# Patient Record
Sex: FEMALE | Race: BLACK OR AFRICAN AMERICAN | Employment: UNEMPLOYED | URBAN - METROPOLITAN AREA
[De-identification: names, ages, dates, MRNs, and addresses within clinical notes are randomized per-mention and may not be internally consistent; named-entity substitution may affect disease eponyms.]

---

## 2017-02-24 ENCOUNTER — HOSPITAL ENCOUNTER (OUTPATIENT)
Dept: ULTRASOUND IMAGING | Age: 57
Discharge: HOME | End: 2017-02-24
Attending: PHYSICIAN ASSISTANT
Payer: MEDICAID

## 2017-02-24 DIAGNOSIS — N95.0 POSTMENOPAUSAL BLEEDING: ICD-10-CM

## 2017-02-24 PROCEDURE — 76830 TRANSVAGINAL US NON-OB: CPT

## 2017-02-24 NOTE — PROGRESS NOTES
Ultrasound shows an endometrial lining of her uterus that is too thick. She will need a gyn referral to have a biopsy done. We will arrange for that during upcoming appt on Monday.

## 2017-04-13 PROCEDURE — 88305 TISSUE EXAM BY PATHOLOGIST: CPT | Performed by: OBSTETRICS & GYNECOLOGY

## 2017-04-17 ENCOUNTER — HOSPITAL ENCOUNTER (OUTPATIENT)
Dept: LAB | Age: 57
Discharge: HOME | End: 2017-04-17

## 2017-04-27 ENCOUNTER — HOSPITAL ENCOUNTER (OUTPATIENT)
Dept: MAMMOGRAPHY | Age: 57
Discharge: HOME | End: 2017-04-27
Attending: PHYSICIAN ASSISTANT
Payer: MEDICAID

## 2017-04-27 DIAGNOSIS — Z12.31 ENCOUNTER FOR SCREENING MAMMOGRAM FOR BREAST CANCER: ICD-10-CM

## 2017-04-27 PROCEDURE — 77067 SCR MAMMO BI INCL CAD: CPT

## 2017-05-08 NOTE — PROGRESS NOTES
Spoke with patient and gave results per [facility].   Patient voiced understanding

## 2017-06-21 ENCOUNTER — HOSPITAL ENCOUNTER (OUTPATIENT)
Dept: SURGERY | Age: 57
Discharge: HOME | End: 2017-06-21

## 2017-06-21 NOTE — PERIOP NOTES
Pt did not come for 15:30 PAT appointment. I called and left voicemail asking pt to return call to [phone]. I called and notified Dr Alex Domínguez office- left voicemail with Migue Cosme, surgery scheduler.

## 2017-06-22 ENCOUNTER — HOSPITAL ENCOUNTER (OUTPATIENT)
Dept: SURGERY | Age: 57
Discharge: HOME | End: 2017-06-22
Attending: OBSTETRICS & GYNECOLOGY
Payer: MEDICAID

## 2017-06-22 VITALS
TEMPERATURE: 97.2 F | DIASTOLIC BLOOD PRESSURE: 88 MMHG | RESPIRATION RATE: 14 BRPM | OXYGEN SATURATION: 96 % | HEIGHT: 66 IN | WEIGHT: 258.13 LBS | HEART RATE: 62 BPM | SYSTOLIC BLOOD PRESSURE: 157 MMHG | BODY MASS INDEX: 41.49 KG/M2

## 2017-06-22 LAB
GLUCOSE BLD STRIP.AUTO-MCNC: 148 MG/DL (ref 65–100)
POTASSIUM SERPL-SCNC: 3.5 MMOL/L (ref 3.5–5.1)

## 2017-06-22 PROCEDURE — 82962 GLUCOSE BLOOD TEST: CPT

## 2017-06-22 PROCEDURE — 84132 ASSAY OF SERUM POTASSIUM: CPT | Performed by: ANESTHESIOLOGY

## 2017-06-22 NOTE — PERIOP NOTES
Patient verified name, , and surgery as listed in [facility]. Type 1B surgery, PAT assessment complete. Labs per surgeon: no orders in EMR at this time  Labs per anesthesia protocol: SQBS= 148; K+ collected- result pending  EKG: not needed; EKG in EMR (2017) in EMR for anesthesia reference    Hibiclens and instructions given per hospital policy. Patient provided with handouts including Guide to Surgery, Pain Management, Hand Hygiene, Blood Transfusion Education, and [facility] Brochure. Patient answered medical/surgical history questions at their best of ability. All prior to admission medications documented in [facility]. Original medication prescription bottles not visualized during patient appointment. Patient instructed to hold all vitamins 7 days prior to surgery and NSAIDS 5 days prior to surgery, patient verbalized understanding. Medications to be held- none. Patient instructed to continue previous medications as prescribed prior to surgery and to take the following medications the day of surgery according to anesthesia guidelines with a small sip of water: symbicort inhaler, albuterol inhaler- bring DOS, amlodipine, klonopin, zoloft. Patient taught back and verbalized understanding.

## 2017-06-23 NOTE — INTERVAL H&P NOTE
H&P Update:  Antoine Bowen was seen and examined. History and physical has been reviewed. The patient has been examined.  There have been no significant clinical changes since the completion of the originally dated History and Physical.    Signed By: Anatoly Noe MD     June 23, 2017 8:52 AM

## 2017-06-23 NOTE — PERIOP NOTES
K+ done 6/22/17 wnl    Recent Results (from the past 24 hour(s))   GLUCOSE, POC    Collection Time: 06/22/17  4:00 PM   Result Value Ref Range    Glucose (POC) 148 (H) 65 - 100 mg/dL   POTASSIUM    Collection Time: 06/22/17  4:01 PM   Result Value Ref Range    Potassium 3.5 3.5 - 5.1 mmol/L

## 2017-06-23 NOTE — H&P
HPI:  Shravan Talavera is a 55 y.o. female seen for Post Menopausal Bleeding   .           Past Medical History, Past Surgical History, Family history, Social History, and Medications were all reviewed with the patient today and updated as necessary.           Current Outpatient Prescriptions   Medication Sig    metFORMIN ER (GLUCOPHAGE XR) 500 mg tablet TAKE ONE TABLET BY MOUTH ONE TIME DAILY WITH DINNER    minoxidil (LONITEN) 2.5 mg tablet TAKE ONE TABLET BY MOUTH TWICE A DAY    clonazePAM (KLONOPIN) 1 mg tablet Take 1 Tab by mouth two (2) times a day. Max Daily Amount: 2 mg. Do not fill until 3/10/17 or after.  amLODIPine (NORVASC) 10 mg tablet Take 1 Tab by mouth daily.  sertraline (ZOLOFT) 100 mg tablet Take 1 Tab by mouth daily.  pravastatin (PRAVACHOL) 10 mg tablet Take 1 Tab by mouth nightly.  ergocalciferol (ERGOCALCIFEROL) 50,000 unit capsule Take 1 Cap by mouth every seven (7) days.  sAXagliptin (ONGLYZA) 5 mg tab tablet Take 1 Tab by mouth daily.  losartan (COZAAR) 100 mg tablet Take 1 Tab by mouth daily. Indications: Hypertension    TRUE METRIX GLUCOSE METER misc      ULTRA THIN LANCETS 31 gauge misc      budesonide-formoterol (SYMBICORT) 160-4.5 mcg/actuation HFA inhaler Take 2 Puffs by inhalation two (2) times a day.  zolpidem (AMBIEN) 10 mg tablet Take 1 Tab by mouth nightly as needed for Sleep. Max Daily Amount: 10 mg.    metoprolol succinate (TOPROL-XL) 100 mg tablet Take 1 Tab by mouth nightly.  albuterol (PROVENTIL HFA) 90 mcg/actuation inhaler Take 2 Puffs by inhalation every six (6) hours as needed for Wheezing or Shortness of Breath.  spironolactone (ALDACTONE) 25 mg tablet Take 1 Tab by mouth daily.  fluticasone (FLONASE) 50 mcg/actuation nasal spray Squirt 2 sprays/nostril once daily    Cholecalciferol, Vitamin D3, (VITAMIN D3) 2,000 unit cap capsule Take 2 capsules po daily    cpap machine kit by Does Not Apply route.  13 cm    albuterol (PROVENTIL VENTOLIN) 2.5 mg /3 mL (0.083 %) nebulizer solution 3 mL by Nebulization route every four (4) hours as needed for Wheezing.  levalbuterol (XOPENEX) 1.25 mg/3 mL nebulizer solution 3 mL by Nebulization route every four (4) hours as needed for Wheezing.     TRUE METRIX GLUCOSE TEST STRIP strip        No current facility-administered medications for this visit.            Allergies   Allergen Reactions    Chlorthalidone Other (comments)    Lipitor [Atorvastatin] Other (comments)    Vit D3-Vit K-Berberine-Hops Other (comments)           Past Medical History:   Diagnosis Date    Asthma       Intrinsic    Chronic insomnia      Degenerative arthropathy of spinal facet joint 10/22/2015     L4-L5    Depression      Generalized anxiety disorder      GERD (gastroesophageal reflux disease)      Hypertension      Mixed hyperlipidemia 1/21/2015    Obstructive sleep apnea      Osteoarthritis of hand 8/21/2014    Peripheral neuropathy ([address]      Thoracic degenerative disc disease 11/24/2015     Mild    Tobacco use disorder      Type 2 diabetes mellitus (HCC)      Vitamin D deficiency              Past Surgical History:   Procedure Laterality Date    HX CHOLECYSTECTOMY        HX COLONOSCOPY        HX ENDOSCOPY                Family History   Problem Relation Age of Onset    Diabetes Father      Hypertension Father      Asthma Father      Asthma Sister      Diabetes Sister      Hypertension Sister      COPD Sister      No Known Problems Brother      No Known Problems Sister      No Known Problems Sister      No Known Problems Sister      Stroke Brother      Hypertension Brother               Social History   Substance Use Topics    Smoking status: Current Every Day Smoker       Packs/day: 1.00       Years: 36.00       Types: Cigarettes    Smokeless tobacco: Never Used         Comment: Currently 3-4 cigarettes/day    Alcohol use No         There is no immunization history on file for this patient.        History   Sexual Activity    Sexual activity: No                   Obstetric History       T0      TAB0   SAB0   E0   M0   L0        # Outcome Date GA Lbr Jhony/2nd Weight Sex Delivery Anes PTL Lv   2 Para                     1 Para                         Obstetric Comments   2 vaginal births              Health Maintenance   Topic Date Due    COLONOSCOPY  1979    Pneumococcal 19-64 Medium Risk (1 of 1 - PPSV23) 1980    DTaP/Tdap/Td series (1 - Tdap) 1982    BREAST CANCER SCRN MAMMOGRAM  2011    FOOT EXAM Q1  2017    HEMOGLOBIN A1C Q6M  2017    MICROALBUMIN Q1  2017    LIPID PANEL Q1  2018    EYE EXAM RETINAL OR DILATED Q1  2018    PAP AKA CERVICAL CYTOLOGY  2020    Hepatitis C Screening  Completed    INFLUENZA AGE 9 TO ADULT  Addressed         ROS     Review of Systems      PHYSICAL EXAM:          Visit Vitals    /90 (BP Patient Position: At rest)    Ht 5' 6\" (1.676 m)    Wt 263 lb (119.3 kg)    LMP 2010  Comment: spotting     BMI 42.45 kg/m2         Physical Exam     Medical problems and test results were reviewed with the patient today.      ASSESSMENT and PLAN     Osmany Figueroa was seen today for post menopausal bleeding.     Diagnoses and all orders for this visit:     Postmenopausal bleeding  -     BIOPSY OF UTERUS LINING  -     SURGICAL PATHOLOGY     Thickened endometrium  -     BIOPSY OF UTERUS LINING  -     SURGICAL PATHOLOGY                 Follow-up Disposition: Not on File        Chuy Brandt Medical Assistant  2017         Electronically signed by Chuy Brandt at 17 5922        Office Visit on 2017              Detailed Report         Note Details   Author Chuy Brandt File Time 17 [address] [place] [address]   Author Type Medical Assistant Status Signed   Last  Chuy Brandt Assistant Service (none)

## 2017-06-28 ENCOUNTER — HOSPITAL ENCOUNTER (OUTPATIENT)
Age: 57
Setting detail: OUTPATIENT SURGERY
Discharge: HOME | End: 2017-06-28
Attending: OBSTETRICS & GYNECOLOGY | Admitting: OBSTETRICS & GYNECOLOGY
Payer: MEDICAID

## 2017-06-28 ENCOUNTER — ANESTHESIA (OUTPATIENT)
Dept: SURGERY | Age: 57
End: 2017-06-28
Payer: MEDICAID

## 2017-06-28 ENCOUNTER — ANESTHESIA EVENT (OUTPATIENT)
Dept: SURGERY | Age: 57
End: 2017-06-28
Payer: MEDICAID

## 2017-06-28 VITALS
OXYGEN SATURATION: 92 % | TEMPERATURE: 97.6 F | HEART RATE: 65 BPM | RESPIRATION RATE: 18 BRPM | BODY MASS INDEX: 41.97 KG/M2 | SYSTOLIC BLOOD PRESSURE: 191 MMHG | DIASTOLIC BLOOD PRESSURE: 88 MMHG | WEIGHT: 260 LBS

## 2017-06-28 DIAGNOSIS — N93.8 DUB (DYSFUNCTIONAL UTERINE BLEEDING): Primary | ICD-10-CM

## 2017-06-28 LAB
GLUCOSE BLD STRIP.AUTO-MCNC: 106 MG/DL (ref 65–100)
HCG UR QL: NEGATIVE

## 2017-06-28 PROCEDURE — 76010000138 HC OR TIME 0.5 TO 1 HR: Performed by: OBSTETRICS & GYNECOLOGY

## 2017-06-28 PROCEDURE — 88305 TISSUE EXAM BY PATHOLOGIST: CPT | Performed by: OBSTETRICS & GYNECOLOGY

## 2017-06-28 PROCEDURE — 81025 URINE PREGNANCY TEST: CPT

## 2017-06-28 PROCEDURE — 76210000021 HC REC RM PH II 0.5 TO 1 HR: Performed by: OBSTETRICS & GYNECOLOGY

## 2017-06-28 PROCEDURE — 74011250636 HC RX REV CODE- 250/636

## 2017-06-28 PROCEDURE — 74011250636 HC RX REV CODE- 250/636: Performed by: ANESTHESIOLOGY

## 2017-06-28 PROCEDURE — 76060000032 HC ANESTHESIA 0.5 TO 1 HR: Performed by: OBSTETRICS & GYNECOLOGY

## 2017-06-28 PROCEDURE — 77030020782 HC GWN BAIR PAWS FLX 3M -B: Performed by: ANESTHESIOLOGY

## 2017-06-28 PROCEDURE — 74011000250 HC RX REV CODE- 250

## 2017-06-28 PROCEDURE — 76210000006 HC OR PH I REC 0.5 TO 1 HR: Performed by: OBSTETRICS & GYNECOLOGY

## 2017-06-28 PROCEDURE — 77030018836 HC SOL IRR NACL ICUM -A: Performed by: OBSTETRICS & GYNECOLOGY

## 2017-06-28 PROCEDURE — 77030020143 HC AIRWY LARYN INTUB CGAS -A: Performed by: ANESTHESIOLOGY

## 2017-06-28 PROCEDURE — 82962 GLUCOSE BLOOD TEST: CPT

## 2017-06-28 RX ORDER — SODIUM CHLORIDE 0.9 % (FLUSH) 0.9 %
5-10 SYRINGE (ML) INJECTION AS NEEDED
Status: DISCONTINUED | OUTPATIENT
Start: 2017-06-28 | End: 2017-06-28 | Stop reason: HOSPADM

## 2017-06-28 RX ORDER — LIDOCAINE HYDROCHLORIDE 10 MG/ML
0.1 INJECTION INFILTRATION; PERINEURAL AS NEEDED
Status: DISCONTINUED | OUTPATIENT
Start: 2017-06-28 | End: 2017-06-28 | Stop reason: HOSPADM

## 2017-06-28 RX ORDER — ONDANSETRON 2 MG/ML
INJECTION INTRAMUSCULAR; INTRAVENOUS AS NEEDED
Status: DISCONTINUED | OUTPATIENT
Start: 2017-06-28 | End: 2017-06-28 | Stop reason: HOSPADM

## 2017-06-28 RX ORDER — SODIUM CHLORIDE, SODIUM LACTATE, POTASSIUM CHLORIDE, CALCIUM CHLORIDE 600; 310; 30; 20 MG/100ML; MG/100ML; MG/100ML; MG/100ML
75 INJECTION, SOLUTION INTRAVENOUS CONTINUOUS
Status: DISCONTINUED | OUTPATIENT
Start: 2017-06-28 | End: 2017-06-28 | Stop reason: HOSPADM

## 2017-06-28 RX ORDER — LIDOCAINE HYDROCHLORIDE 20 MG/ML
INJECTION, SOLUTION EPIDURAL; INFILTRATION; INTRACAUDAL; PERINEURAL AS NEEDED
Status: DISCONTINUED | OUTPATIENT
Start: 2017-06-28 | End: 2017-06-28 | Stop reason: HOSPADM

## 2017-06-28 RX ORDER — SODIUM CHLORIDE 0.9 % (FLUSH) 0.9 %
5-10 SYRINGE (ML) INJECTION EVERY 8 HOURS
Status: DISCONTINUED | OUTPATIENT
Start: 2017-06-28 | End: 2017-06-28 | Stop reason: HOSPADM

## 2017-06-28 RX ORDER — HYDROMORPHONE HYDROCHLORIDE 2 MG/ML
0.5 INJECTION, SOLUTION INTRAMUSCULAR; INTRAVENOUS; SUBCUTANEOUS
Status: DISCONTINUED | OUTPATIENT
Start: 2017-06-28 | End: 2017-06-28 | Stop reason: HOSPADM

## 2017-06-28 RX ORDER — OXYCODONE AND ACETAMINOPHEN 5; 325 MG/1; MG/1
1 TABLET ORAL AS NEEDED
Status: DISCONTINUED | OUTPATIENT
Start: 2017-06-28 | End: 2017-06-28 | Stop reason: HOSPADM

## 2017-06-28 RX ORDER — FENTANYL CITRATE 50 UG/ML
INJECTION, SOLUTION INTRAMUSCULAR; INTRAVENOUS AS NEEDED
Status: DISCONTINUED | OUTPATIENT
Start: 2017-06-28 | End: 2017-06-28 | Stop reason: HOSPADM

## 2017-06-28 RX ORDER — NALOXONE HYDROCHLORIDE 0.4 MG/ML
0.2 INJECTION, SOLUTION INTRAMUSCULAR; INTRAVENOUS; SUBCUTANEOUS AS NEEDED
Status: DISCONTINUED | OUTPATIENT
Start: 2017-06-28 | End: 2017-06-28 | Stop reason: HOSPADM

## 2017-06-28 RX ORDER — PROPOFOL 10 MG/ML
INJECTION, EMULSION INTRAVENOUS AS NEEDED
Status: DISCONTINUED | OUTPATIENT
Start: 2017-06-28 | End: 2017-06-28 | Stop reason: HOSPADM

## 2017-06-28 RX ORDER — FAMOTIDINE 20 MG/1
20 TABLET, FILM COATED ORAL ONCE
Status: DISCONTINUED | OUTPATIENT
Start: 2017-06-28 | End: 2017-06-28 | Stop reason: HOSPADM

## 2017-06-28 RX ORDER — MIDAZOLAM HYDROCHLORIDE 1 MG/ML
2 INJECTION, SOLUTION INTRAMUSCULAR; INTRAVENOUS
Status: DISCONTINUED | OUTPATIENT
Start: 2017-06-28 | End: 2017-06-28 | Stop reason: HOSPADM

## 2017-06-28 RX ORDER — HYDROCODONE BITARTRATE AND ACETAMINOPHEN 7.5; 325 MG/1; MG/1
1 TABLET ORAL
Qty: 6 TAB | Refills: 0 | Status: SHIPPED | OUTPATIENT
Start: 2017-06-28 | End: 2017-11-28

## 2017-06-28 RX ORDER — HYDRALAZINE HYDROCHLORIDE 20 MG/ML
10 INJECTION INTRAMUSCULAR; INTRAVENOUS
Status: COMPLETED | OUTPATIENT
Start: 2017-06-28 | End: 2017-06-28

## 2017-06-28 RX ADMIN — LIDOCAINE HYDROCHLORIDE 40 MG: 20 INJECTION, SOLUTION EPIDURAL; INFILTRATION; INTRACAUDAL; PERINEURAL at 08:13

## 2017-06-28 RX ADMIN — PROPOFOL 200 MG: 10 INJECTION, EMULSION INTRAVENOUS at 08:13

## 2017-06-28 RX ADMIN — SODIUM CHLORIDE, SODIUM LACTATE, POTASSIUM CHLORIDE, AND CALCIUM CHLORIDE: 600; 310; 30; 20 INJECTION, SOLUTION INTRAVENOUS at 08:01

## 2017-06-28 RX ADMIN — HYDRALAZINE HYDROCHLORIDE 10 MG: 20 INJECTION INTRAMUSCULAR; INTRAVENOUS at 09:06

## 2017-06-28 RX ADMIN — ONDANSETRON 4 MG: 2 INJECTION INTRAMUSCULAR; INTRAVENOUS at 08:21

## 2017-06-28 RX ADMIN — FENTANYL CITRATE 50 MCG: 50 INJECTION, SOLUTION INTRAMUSCULAR; INTRAVENOUS at 08:02

## 2017-06-28 NOTE — BRIEF OP NOTE
BRIEF OPERATIVE NOTE    Date of Procedure: 6/28/2017   Preoperative Diagnosis: Post-menopausal bleeding [N95.0]  Postoperative Diagnosis: Post-menopausal bleeding [N95.0]    Procedure(s):  DILATATION AND CURETTAGE HYSTEROSCOPY  Surgeon(s) and Role:     * Rambo Andre MD - Primary         Assistant Staff:       Surgical Staff:  Circ-1: Anoop Lafleur RN  Scrub Tech-1: Tsering Del Cid  Event Time In   Incision Start 7715   Incision Close 0830     Anesthesia: General   Estimated Blood Loss: 5  Specimens:   ID Type Source Tests Collected by Time Destination   1 : Endocervical currettings Preservative Pelvis  Rambo Andre MD 6/28/2017 0022 Pathology   2 : Endometrial currettings Preservative Pelvis  Rambo Andre MD 6/28/2017 0825 Pathology      Findings: diffusely thickened endo w/ polyp @ int os   Complications: 0  Implants: * No implants in log *

## 2017-06-28 NOTE — OP NOTES
HYSTEROSCOPY WITH DILATATION AND CURETTAGE    DATE OF PROCEDURE: 6/28/2017     PREOPERATIVE DIAGNOSIS: Post-menopausal bleeding [N95.0]     POSTOPERATWE DIAGNOSIS: Post-menopausal bleeding [N95.0]    PROCEDURE: Hysteroscopy with dilatation & curettage. SURGEON: Kiki Rodriguez MD    ANESTHESIA: General.    DRAINS: Sterile in and out catheterization of the bladder. FINDINGS: Normal endocervical canal with a small polypoid structure at the internal  os. Endometrial cavity was symmetrical, homogenous with palpation and curettage. Tubal ostia was readily visible, symmetrical and in appropriate locations. No visible evidence of any surface irregularities of the endometrial surface and no visible asymmetry to suggest a point of penetration or an embedded foreign object    PROCEDURE IN DETAIL: The patient was taken to the Operating Room. The patient was anesthetized using GET. After adequate anesthesia was established she was properly positioned, scrubbed, prepped and draped. Time out was done to confirm the operating procedure, surgeon, patient and site. Once confirmed by the team, procedure was started. The weighted speculum was placed in the vault to expose the cervix, was grasped at 12 oclock with the single-tooth tenaculum and sounded to 8 cm. It was sequentially dilated prior to the hysteroscope being inserted with the above noted findings. A very gentle but homogenous curetting was done starting in the midline and working in a clockwise manner. A small amount of normal appearing endometrial tissue was reirieved. Preston-Stone forceps were used to remove the clots that were seen when hysteroscope was reinserted. The hysteroscope was reinserted again. There was no evidence of any surface irregularity to suggest recent penetration or presence of an embedded foreign object such as an intrauterine device or other foreign object. With this complete and thorough visual review, we terminated the procedure.  With the sponge, instrument and needle count correct, the patient was awakened and taken to the Recovery Room in satisfactory condition. BLOOD LOSS ESTIMATED: minimal    SPECIMENS: Endocervical curettings; Endometrial curettings.

## 2017-06-28 NOTE — ANESTHESIA POSTPROCEDURE EVALUATION
Post-Anesthesia Evaluation and Assessment    Patient: Raffy Hernandez MRN: [id number]  SSN: [id number]    YOB: 1960  Age: 55 y.o. Sex: female       Cardiovascular Function/Vital Signs  Visit Vitals    /88 (BP 1 Location: Left arm, BP Patient Position: At rest)    Pulse 65    Temp 36.4 °C (97.6 °F)    Resp 18    Wt 117.9 kg (260 lb)    SpO2 92%    BMI 41.97 kg/m2       Patient is status post general anesthesia for Procedure(s):  DILATATION AND CURETTAGE HYSTEROSCOPY. Nausea/Vomiting: None    Postoperative hydration reviewed and adequate. Pain:  Pain Scale 1: Visual (06/28/17 0937)  Pain Intensity 1: 0 (06/28/17 0937)   Managed    Neurological Status:   Neuro (WDL): Exceptions to WDL (06/28/17 0922)  Neuro  Neurologic State: Sleeping (06/28/17 0922)  LUE Motor Response: Purposeful (06/28/17 0922)  LLE Motor Response: Purposeful (06/28/17 0922)  RUE Motor Response: Purposeful (06/28/17 2072)  RLE Motor Response: Purposeful (06/28/17 7374)   At baseline    Mental Status and Level of Consciousness: Arousable    Pulmonary Status:   O2 Device: Room air (06/28/17 0937)   Adequate oxygenation and airway patent    Complications related to anesthesia: None    Post-anesthesia assessment completed.  No concerns    Signed By: Aliya Gottlieb MD     June 28, 2017

## 2017-06-28 NOTE — IP AVS SNAPSHOT
[address] 
[phone] Patient: Florentin Kaiser MRN: [id number] You are allergic to the following Allergen Reactions Chlorthalidone Other (comments) Lipitor (Atorvastatin) Other (comments) Vit D3-Vit K-Berberine-Hops Other (comments) Recent Documentation Weight BMI OB Status Smoking Status 117.9 kg 41.97 kg/m2 Postmenopausal Current Every Day Smoker Emergency Contacts Name Discharge Info Relation Home Work Mobile Eduardo Zee  Sister [23] [phone] None,None DECLINED CAREGIVER [4] About your hospitalization You were admitted on:  June 28, 2017 [facility] You were discharged on:  June 28, 2017 Unit phone number:  [phone] Why you were hospitalized Your primary diagnosis was:  Not on File Providers Seen During Your Hospitalizations Provider Role Specialty Primary office phone Sandy Booth MD Attending Provider Obstetrics & Gynecology [phone] Your Primary Care Physician (PCP) Primary Care Physician Office Phone Office Fax Taj Paul 1 Angelina Aponte Follow-up Information Follow up With Details Comments Contact Info Sandy Booth MD Schedule an appointment as soon as possible for a visit in 2 week(s)  [address] [place] [address] 
[phone] Your Appointments Thursday July 13, 2017 11:30 AM EDT  
POST OP with Sandy Booth MD  
[facility] ([address] [place] [address]) [facility] [address] [phone] Current Discharge Medication List  
  
START taking these medications Dose & Instructions Dispensing Information Comments Morning Noon Evening Bedtime HYDROcodone-acetaminophen 7.5-325 mg per tablet Commonly known as:  Karole Dakins Your last dose was: Your next dose is:    
   
   
 Dose:  1 Tab Take 1 Tab by mouth every six (6) hours as needed for Pain. Max Daily Amount: 4 Tabs. Quantity:  6 Tab Refills:  0 CONTINUE these medications which have CHANGED Dose & Instructions Dispensing Information Comments Morning Noon Evening Bedtime  
 fluticasone 50 mcg/actuation nasal spray Commonly known as:  Carmela Crow What changed:   
- how much to take 
- how to take this - when to take this 
- additional instructions Your last dose was: Your next dose is:    
   
   
 Squirt 2 sprays/nostril once daily Quantity:  1 Bottle Refills:  2 CONTINUE these medications which have NOT CHANGED Dose & Instructions Dispensing Information Comments Morning Noon Evening Bedtime * albuterol 2.5 mg /3 mL (0.083 %) nebulizer solution Commonly known as:  PROVENTIL VENTOLIN Your last dose was: Your next dose is:    
   
   
 Dose:  2.5 mg  
3 mL by Nebulization route every four (4) hours as needed for Wheezing. Quantity:  1 Package Refills:  11  
     
   
   
   
  
 * albuterol 90 mcg/actuation inhaler Commonly known as:  PROVENTIL HFA Your last dose was: Your next dose is:    
   
   
 Dose:  2 Puff Take 2 Puffs by inhalation every six (6) hours as needed for Wheezing or Shortness of Breath. Quantity:  1 Inhaler Refills:  5  
     
   
   
   
  
 amLODIPine 10 mg tablet Commonly known as:  Opal Heymann Your last dose was: Your next dose is:    
   
   
 Dose:  10 mg Take 1 Tab by mouth daily. Quantity:  30 Tab Refills:  5  
     
   
   
   
  
 budesonide-formoterol 160-4.5 mcg/actuation HFA inhaler Commonly known as:  SYMBICORT Your last dose was: Your next dose is:    
   
   
 Dose:  2 Puff Take 2 Puffs by inhalation two (2) times a day. Quantity:  1 Inhaler Refills:  5 Cholecalciferol (Vitamin D3) 2,000 unit Cap capsule Commonly known as:  VITAMIN D3 Your last dose was: Your next dose is: Take 2 capsules po daily Quantity:  60 Cap Refills:  5  
     
   
   
   
  
 clonazePAM 1 mg tablet Commonly known as:  Jim Balsam Your last dose was: Your next dose is:    
   
   
 Dose:  1 mg Take 1 Tab by mouth two (2) times a day. Max Daily Amount: 2 mg. Do not fill until 6/22/17 or after. Quantity:  60 Tab Refills:  2  
     
   
   
   
  
 cpap machine kit Your last dose was: Your next dose is:    
   
   
 by Does Not Apply route. 13 cm Refills:  0  
     
   
   
   
  
 ergocalciferol 50,000 unit capsule Commonly known as:  ERGOCALCIFEROL Your last dose was: Your next dose is:    
   
   
 Dose:  18742 Units Take 1 Cap by mouth every seven (7) days. Quantity:  4 Cap Refills:  5  
     
   
   
   
  
 levalbuterol 1.25 mg/3 mL Nebu Commonly known as:  Delacruz Oms Your last dose was: Your next dose is:    
   
   
 Dose:  1.25 mg  
3 mL by Nebulization route every four (4) hours as needed for Wheezing. Quantity:  1 Package Refills:  11  
     
   
   
   
  
 linagliptin 5 mg tablet Commonly known as:  Phi Flores Your last dose was: Your next dose is:    
   
   
 Dose:  5 mg Take 1 Tab by mouth daily. Quantity:  30 Tab Refills:  5  
     
   
   
   
  
 losartan 100 mg tablet Commonly known as:  COZAAR Your last dose was: Your next dose is:    
   
   
 Dose:  100 mg Take 1 Tab by mouth daily. Indications: Hypertension Quantity:  30 Tab Refills:  6  
     
   
   
   
  
 metoprolol succinate 100 mg tablet Commonly known as:  TOPROL-XL Your last dose was:     
   
Your next dose is:    
   
   
 Dose:  100 mg  
 Take 1 Tab by mouth nightly. Quantity:  30 Tab Refills:  5  
     
   
   
   
  
 pravastatin 10 mg tablet Commonly known as:  PRAVACHOL Your last dose was: Your next dose is:    
   
   
 Dose:  10 mg Take 1 Tab by mouth nightly. Quantity:  30 Tab Refills:  5  
     
   
   
   
  
 sertraline 100 mg tablet Commonly known as:  ZOLOFT Your last dose was: Your next dose is:    
   
   
 Dose:  100 mg Take 1 Tab by mouth daily. Quantity:  30 Tab Refills:  5  
     
   
   
   
  
 spironolactone 25 mg tablet Commonly known as:  ALDACTONE Your last dose was: Your next dose is:    
   
   
 Dose:  25 mg Take 1 Tab by mouth daily. Quantity:  30 Tab Refills:  5 TRUE METRIX GLUCOSE METER Misc Generic drug:  Blood-Glucose Meter Your last dose was: Your next dose is:    
   
   
  Refills:  0  
     
   
   
   
  
 TRUE METRIX GLUCOSE TEST STRIP strip Generic drug:  glucose blood VI test strips Your last dose was: Your next dose is:    
   
   
  Refills:  0  
     
   
   
   
  
 ULTRA THIN LANCETS 31 gauge Misc Generic drug:  lancets Your last dose was: Your next dose is:    
   
   
  Refills:  0  
     
   
   
   
  
 zolpidem 10 mg tablet Commonly known as:  AMBIEN Your last dose was: Your next dose is:    
   
   
 Dose:  10 mg Take 1 Tab by mouth nightly as needed for Sleep. Max Daily Amount: 10 mg. Do not fill until 6/20/17 or after. Quantity:  30 Tab Refills:  5  
     
   
   
   
  
 * Notice: This list has 2 medication(s) that are the same as other medications prescribed for you. Read the directions carefully, and ask your doctor or other care provider to review them with you. Where to Get Your Medications Information on where to get these meds will be given to you by the nurse or doctor. ! Ask your nurse or doctor about these medications HYDROcodone-acetaminophen 7.5-325 mg per tablet Discharge Instructions INSTRUCTIONS FOLLOWING HYSTEROSCOPY 
 
ACTIVITY  Limited activity today; increase as tolerated tomorrow, but no vigorous exercise  Shower only; no tub baths  No douches, tampons or intercourse until your doctor releases you (at least 2 weeks)  May return to work or school as directed by your doctor DIET  Clear liquids until no nausea or vomiting  Advance to regular diet as tolerated PAIN 
 Expect a moderate amount of pain.  Take pain medication as directed by your doctor. If no prescription for pain is sent     
   home with you, take the appropriate dose of your commonly used pain medication.  Call your doctor if pain is NOT relieved by medication.  DO NOT take aspirin or blood thinners until directed by your doctor. FOLLOW-UP PHONE CALLS  Calls will be made by nursing staff  If you have any problems, call your doctor as needed. CALL YOUR DOCTOR IF 
 Excessive bleeding that soaks a pad in an hour  Temperature of 101°F or above  Green or yellow, smelly discharge  Unable to urinate by bedtime  Nausea and vomiting that does not stop by bedtime AFTER ANESTHESIA  For the first 24 hours: DO NOT Drive, Drink alcoholic beverages, or Make important decisions.  Beware of dizziness following anesthesia and while taking pain medication.  Plan to stay tonight within [address] of the hospital 
 
 
 
 
Discharge Orders None Introducing [facility]! Pretty Tiwari introduces [employer] patient portal. Now you can access parts of your medical record, email your doctor's office, and request medication refills online. 1. In your internet browser, go to [email or web address] 2. Click on the First Time User? Click Here link in the Sign In box. You will see the New Member Sign Up page. 3. Enter your [employer] Access Code exactly as it appears below. You will not need to use this code after youve completed the sign-up process. If you do not sign up before the expiration date, you must request a new code. · [employer] Access Code: [email or web address] Expires: 8/23/2017  4:15 PM 
 
4. Enter the last four digits of your Social Security Number (xxxx) and Date of Birth (mm/dd/yyyy) as indicated and click Submit. You will be taken to the next sign-up page. 5. Create a [employer] ID. This will be your [employer] login ID and cannot be changed, so think of one that is secure and easy to remember. 6. Create a [employer] password. You can change your password at any time. 7. Enter your Password Reset Question and Answer. This can be used at a later time if you forget your password. 8. Enter your e-mail address. You will receive e-mail notification when new information is available in [address] 9. Click Sign Up. You can now view and download portions of your medical record. 10. Click the Download Summary menu link to download a portable copy of your medical information. If you have questions, please visit the Frequently Asked Questions section of the [employer] website. Remember, [employer] is NOT to be used for urgent needs. For medical emergencies, dial 911. Now available from your iPhone and Android! General Information Please provide this summary of care documentation to your next provider. Patient Signature:  ____________________________________________________________ Date:  ____________________________________________________________  
  
Emeka End Provider Signature:  ____________________________________________________________ Date:  ____________________________________________________________

## 2017-06-28 NOTE — ANESTHESIA PREPROCEDURE EVALUATION
Anesthetic History   No history of anesthetic complications            Review of Systems / Medical History  Patient summary reviewed, nursing notes reviewed and pertinent labs reviewed    Pulmonary        Sleep apnea: CPAP  Smoker  Asthma : well controlled       Neuro/Psych   Within defined limits           Cardiovascular    Hypertension: well controlled          Hyperlipidemia    Exercise tolerance: >4 METS     GI/Hepatic/Renal                Endo/Other    Diabetes: well controlled, type 2    Obesity and arthritis     Other Findings              Physical Exam    Airway  Mallampati: II  TM Distance: 4 - 6 cm  Neck ROM: normal range of motion   Mouth opening: Normal     Cardiovascular    Rhythm: regular           Dental    Dentition: Full upper dentures and Full lower dentures     Pulmonary  Breath sounds clear to auscultation               Abdominal         Other Findings            Anesthetic Plan    ASA: 3  Anesthesia type: general          Induction: Intravenous  Anesthetic plan and risks discussed with: Patient

## 2017-07-05 NOTE — PROGRESS NOTES
Patient notified of results. She did have some bleeding but had picked up 2 cases of water. Advised to not lift anything over 10lbs until post op visit but call if bleeding is more than a pad per hour.

## 2017-08-07 ENCOUNTER — APPOINTMENT (OUTPATIENT)
Dept: GENERAL RADIOLOGY | Age: 57
End: 2017-08-07
Attending: NURSE PRACTITIONER
Payer: MEDICAID

## 2017-08-07 ENCOUNTER — HOSPITAL ENCOUNTER (EMERGENCY)
Age: 57
Discharge: HOME | End: 2017-08-07
Attending: EMERGENCY MEDICINE
Payer: MEDICAID

## 2017-08-07 VITALS
WEIGHT: 262 LBS | DIASTOLIC BLOOD PRESSURE: 90 MMHG | TEMPERATURE: 97.5 F | SYSTOLIC BLOOD PRESSURE: 177 MMHG | HEART RATE: 60 BPM | OXYGEN SATURATION: 96 % | HEIGHT: 66 IN | BODY MASS INDEX: 42.11 KG/M2 | RESPIRATION RATE: 20 BRPM

## 2017-08-07 DIAGNOSIS — M54.50 ACUTE BILATERAL LOW BACK PAIN WITHOUT SCIATICA: Primary | ICD-10-CM

## 2017-08-07 PROCEDURE — 72100 X-RAY EXAM L-S SPINE 2/3 VWS: CPT

## 2017-08-07 PROCEDURE — 99284 EMERGENCY DEPT VISIT MOD MDM: CPT | Performed by: NURSE PRACTITIONER

## 2017-08-07 PROCEDURE — 74011250636 HC RX REV CODE- 250/636: Performed by: NURSE PRACTITIONER

## 2017-08-07 PROCEDURE — 81003 URINALYSIS AUTO W/O SCOPE: CPT | Performed by: NURSE PRACTITIONER

## 2017-08-07 PROCEDURE — 96372 THER/PROPH/DIAG INJ SC/IM: CPT | Performed by: NURSE PRACTITIONER

## 2017-08-07 RX ORDER — KETOROLAC TROMETHAMINE 30 MG/ML
30 INJECTION, SOLUTION INTRAMUSCULAR; INTRAVENOUS
Status: COMPLETED | OUTPATIENT
Start: 2017-08-07 | End: 2017-08-07

## 2017-08-07 RX ORDER — METHOCARBAMOL 750 MG/1
750 TABLET, FILM COATED ORAL 3 TIMES DAILY
Qty: 15 TAB | Refills: 0 | Status: SHIPPED | OUTPATIENT
Start: 2017-08-07 | End: 2017-08-07

## 2017-08-07 RX ORDER — MELOXICAM 7.5 MG/1
7.5 TABLET ORAL DAILY
Qty: 5 TAB | Refills: 0 | Status: SHIPPED | OUTPATIENT
Start: 2017-08-07

## 2017-08-07 RX ORDER — NAPROXEN SODIUM 550 MG/1
550 TABLET ORAL 2 TIMES DAILY WITH MEALS
Qty: 10 TAB | Refills: 0 | Status: SHIPPED | OUTPATIENT
Start: 2017-08-07 | End: 2017-08-07

## 2017-08-07 RX ORDER — METHOCARBAMOL 750 MG/1
750 TABLET, FILM COATED ORAL 3 TIMES DAILY
Qty: 15 TAB | Refills: 0 | Status: SHIPPED | OUTPATIENT
Start: 2017-08-07 | End: 2018-07-16

## 2017-08-07 RX ADMIN — KETOROLAC TROMETHAMINE 30 MG: 30 INJECTION, SOLUTION INTRAMUSCULAR at 11:15

## 2017-08-07 NOTE — ED PROVIDER NOTES
HPI Comments: Patient presents with lower back pain that started this past Saturday. She states pain increases with movement. She denies pain radiating and denies numbness and tingling. She states urinary frequency. She denies known injury to her back. Patient is a 55 y.o. female presenting with back pain. The history is provided by the patient. Back Pain    This is a new problem. The current episode started more than 2 days ago. The problem has not changed since onset. The problem occurs constantly. Patient reports not work related injury. The pain is associated with no known injury. The pain is present in the lumbar spine. The quality of the pain is described as shooting. The pain does not radiate. The pain is at a severity of 7/10. The pain is mild. The symptoms are aggravated by certain positions. Pertinent negatives include no chest pain, no fever, no numbness, no weight loss, no headaches, no abdominal pain, no abdominal swelling, no bowel incontinence, no perianal numbness, no bladder incontinence, no dysuria, no pelvic pain, no leg pain, no paresthesias, no paresis and no tingling. She has tried nothing for the symptoms. Risk factors include obesity.         Past Medical History:   Diagnosis Date    Asthma     Intrinsic    Chronic insomnia     Degenerative arthropathy of spinal facet joint 10/22/2015    L4-L5    Depression     Generalized anxiety disorder     GERD (gastroesophageal reflux disease)     Hypertension     Mixed hyperlipidemia 1/21/2015    Obstructive sleep apnea     uses cpap machine    Osteoarthritis of hand 8/21/2014    Peripheral neuropathy (HCC)     hands    PMB (postmenopausal bleeding)     Thoracic degenerative disc disease 11/24/2015    Mild    Tobacco use disorder     Type 2 diabetes mellitus (HCC)     oral meds, AM fasting range 100-118, hypo <80, hgba1c- 6.2 (5/2017)    Vitamin D deficiency        Past Surgical History:   Procedure Laterality Date    HX CHOLECYSTECTOMY      HX COLONOSCOPY      HX DILATION AND CURETTAGE      HX ENDOSCOPY           Family History:   Problem Relation Age of Onset    Diabetes Father     Hypertension Father     Asthma Father     Asthma Sister     Diabetes Sister     Hypertension Sister    [employer] COPD Sister     No Known Problems Brother     No Known Problems Sister     No Known Problems Sister     No Known Problems Sister     Stroke Brother     Hypertension Brother     Breast Cancer Neg Hx        Social History     Social History    Marital status: SINGLE     Spouse name: N/A    Number of children: N/A    Years of education: N/A     Occupational History    Not on file. Social History Main Topics    Smoking status: Current Every Day Smoker     Packs/day: 0.25     Years: 36.00     Types: Cigarettes    Smokeless tobacco: Never Used      Comment: Currently 3-4 cigarettes/day    Alcohol use No    Drug use: No    Sexual activity: No     Other Topics Concern    Caffeine Concern Yes    Exercise No    Seat Belt Yes    Self-Exams Yes     Social History Narrative    Denies any sexual or physical abuse and feels safe at home. ALLERGIES: Chlorthalidone; Lipitor [atorvastatin]; and Vit d3-vit k-berberine-hops    Review of Systems   Constitutional: Negative for chills, fever and weight loss. Respiratory: Negative for cough and shortness of breath. Cardiovascular: Negative for chest pain. Gastrointestinal: Negative for abdominal pain and bowel incontinence. Genitourinary: Positive for frequency. Negative for bladder incontinence, dysuria and pelvic pain. Musculoskeletal: Positive for back pain. Neurological: Negative for dizziness, tingling, numbness, headaches and paresthesias.        Vitals:    08/07/17 1036   BP: 177/90   Pulse: 60   Resp: 20   Temp: 97.5 °F (36.4 °C)   SpO2: 96%   Weight: 118.8 kg (262 lb)   Height: 5' 6\" (1.676 m)            Physical Exam   Constitutional: She is oriented to person, place, and time. Cardiovascular: Normal rate and regular rhythm. Pulmonary/Chest: Effort normal and breath sounds normal.   Musculoskeletal:        Lumbar back: She exhibits pain. She exhibits no tenderness and normal pulse. Neurological: She is alert and oriented to person, place, and time. She has normal strength. No cranial nerve deficit or sensory deficit. She displays a negative Romberg sign. Coordination and gait normal. GCS eye subscore is 4. GCS verbal subscore is 5. GCS motor subscore is 6. Skin: Skin is warm and dry. Nursing note and vitals reviewed. Xr Spine Lumb 2 Or 3 V    Result Date: 8/7/2017  Lumbar spine radiographs History: Severe low back pain for 2 days. Comparison: 10/22/2015 Findings: There are 5 nonrib-bearing lumbar type vertebrae. Alignment is within normal limits. There is no vertebral body compression fracture. There is mild multilevel degenerative disc disease and facet arthropathy. There are several calcifications in the right abdomen that could represent renal calculi. Impression:  1. Mild degenerative changes of the lumbar spine. No acute bony abnormality. 2. Several calcifications in the right abdomen could represent renal calculi. MDM  Number of Diagnoses or Management Options  Acute bilateral low back pain without sciatica:   Diagnosis management comments: Xray positive for mild degenerative changes. Patient given IM toradol while in the department. Patient given prescriptions for mobic and robaxin.         Amount and/or Complexity of Data Reviewed  Tests in the radiology section of CPT®: ordered and reviewed  Tests in the medicine section of CPT®: ordered and reviewed    Patient Progress  Patient progress: stable    ED Course       Procedures

## 2017-11-02 ENCOUNTER — HOSPITAL ENCOUNTER (OUTPATIENT)
Dept: ULTRASOUND IMAGING | Age: 57
Discharge: HOME | End: 2017-11-02
Attending: PHYSICIAN ASSISTANT
Payer: MEDICAID

## 2017-11-02 DIAGNOSIS — M54.50 CHRONIC BILATERAL LOW BACK PAIN WITHOUT SCIATICA: ICD-10-CM

## 2017-11-02 DIAGNOSIS — R93.89 ABNORMAL X-RAY: ICD-10-CM

## 2017-11-02 DIAGNOSIS — G89.29 CHRONIC BILATERAL LOW BACK PAIN WITHOUT SCIATICA: ICD-10-CM

## 2017-11-02 PROCEDURE — 76770 US EXAM ABDO BACK WALL COMP: CPT

## 2017-12-21 ENCOUNTER — HOSPITAL ENCOUNTER (OUTPATIENT)
Dept: CT IMAGING | Age: 57
Discharge: HOME | End: 2017-12-21
Attending: PHYSICIAN ASSISTANT
Payer: MEDICAID

## 2017-12-21 DIAGNOSIS — R93.5 ABNORMAL X-RAY OF ABDOMEN: ICD-10-CM

## 2017-12-21 DIAGNOSIS — N13.30 HYDRONEPHROSIS DETERMINED BY ULTRASOUND: ICD-10-CM

## 2017-12-21 PROCEDURE — 74176 CT ABD & PELVIS W/O CONTRAST: CPT

## 2017-12-21 NOTE — PROGRESS NOTES
CT did not show anything other than a small R renal cyst - not causing her any issues, mild sigmoid diverticulosis - not inflamed so not the cause of any pain. There are no kidney stones or enlarged kidney. We can discuss further at next office visit if she desires.

## 2018-01-02 PROBLEM — J45.909 ASTHMA IN ADULT: Status: ACTIVE | Noted: 2018-01-02

## 2018-05-14 ENCOUNTER — HOSPITAL ENCOUNTER (OUTPATIENT)
Dept: MAMMOGRAPHY | Age: 58
Discharge: HOME | End: 2018-05-14
Attending: PHYSICIAN ASSISTANT
Payer: MEDICAID

## 2018-05-14 DIAGNOSIS — Z12.31 ENCOUNTER FOR SCREENING MAMMOGRAM FOR BREAST CANCER: ICD-10-CM

## 2018-05-14 PROCEDURE — 77067 SCR MAMMO BI INCL CAD: CPT

## (undated) DEVICE — AMD ANTIMICROBIAL NON-ADHERENT PAD,0.2% POLYHEXAMETHYLENE BIGUANIDE HCI (PHMB): Brand: TELFA

## (undated) DEVICE — CYSTO: Brand: MEDLINE INDUSTRIES, INC.

## (undated) DEVICE — DRAPE TWL SURG 16X26IN BLU ORB04] ALLCARE INC]

## (undated) DEVICE — CONTAINER SPEC FRMLN 120ML --

## (undated) DEVICE — GOWN,REINF,POLY,ECL,PP SLV,XL: Brand: MEDLINE

## (undated) DEVICE — SOLUTION IRRIG 3000ML 0.9% SOD CHL FLX CONT 0797208] ICU MEDICAL INC]

## (undated) DEVICE — TRAY PREP DRY W/ PREM GLV 2 APPL 6 SPNG 2 UNDPD 1 OVERWRAP

## (undated) DEVICE — PERI-PAD,MODERATE: Brand: CURITY

## (undated) DEVICE — CARDINAL HEALTH FLEXIBLE LIGHT HANDLE COVER: Brand: CARDINAL HEALTH